# Patient Record
Sex: FEMALE | Race: WHITE | Employment: FULL TIME | URBAN - METROPOLITAN AREA
[De-identification: names, ages, dates, MRNs, and addresses within clinical notes are randomized per-mention and may not be internally consistent; named-entity substitution may affect disease eponyms.]

---

## 2017-03-03 PROBLEM — N92.1 MENOMETRORRHAGIA: Status: ACTIVE | Noted: 2017-03-03

## 2018-12-10 ENCOUNTER — HOSPITAL ENCOUNTER (OUTPATIENT)
Age: 48
Discharge: HOME | End: 2018-12-12
Payer: COMMERCIAL

## 2018-12-10 PROCEDURE — 88305 TISSUE EXAM BY PATHOLOGIST: CPT

## 2019-11-11 ENCOUNTER — HOSPITAL ENCOUNTER (OUTPATIENT)
Dept: GENERAL RADIOLOGY | Age: 49
Discharge: HOME | End: 2019-11-13
Payer: COMMERCIAL

## 2019-11-11 DIAGNOSIS — Z12.31 BREAST CANCER SCREENING BY MAMMOGRAM: ICD-10-CM

## 2019-11-11 PROCEDURE — 77063 BREAST TOMOSYNTHESIS BI: CPT

## 2021-03-11 ENCOUNTER — HOSPITAL ENCOUNTER (OUTPATIENT)
Dept: GENERAL RADIOLOGY | Age: 51
Discharge: HOME | End: 2021-03-13
Payer: COMMERCIAL

## 2021-03-11 DIAGNOSIS — Z12.31 SCREENING MAMMOGRAM, ENCOUNTER FOR: ICD-10-CM

## 2021-03-11 PROCEDURE — 77063 BREAST TOMOSYNTHESIS BI: CPT

## 2021-04-08 ENCOUNTER — HOSPITAL ENCOUNTER (OUTPATIENT)
Dept: GENERAL RADIOLOGY | Age: 51
Discharge: HOME | End: 2021-04-10
Payer: COMMERCIAL

## 2021-04-08 DIAGNOSIS — R92.2 DENSE BREAST TISSUE ON MAMMOGRAM: ICD-10-CM

## 2021-04-08 PROCEDURE — 76641 ULTRASOUND BREAST COMPLETE: CPT

## 2022-08-03 PROBLEM — N92.1 MENOMETRORRHAGIA: Status: RESOLVED | Noted: 2017-03-03 | Resolved: 2022-08-03

## 2022-12-05 ENCOUNTER — HOSPITAL ENCOUNTER (OUTPATIENT)
Dept: GENERAL RADIOLOGY | Age: 52
Discharge: HOME | End: 2022-12-07
Payer: COMMERCIAL

## 2022-12-05 VITALS — HEIGHT: 64 IN | WEIGHT: 135 LBS | BODY MASS INDEX: 23.05 KG/M2

## 2022-12-05 DIAGNOSIS — Z12.31 ENCOUNTER FOR SCREENING MAMMOGRAM FOR MALIGNANT NEOPLASM OF BREAST: ICD-10-CM

## 2022-12-05 PROCEDURE — 77067 SCR MAMMO BI INCL CAD: CPT

## 2023-12-06 ENCOUNTER — HOSPITAL ENCOUNTER (OUTPATIENT)
Dept: GENERAL RADIOLOGY | Age: 53
Discharge: HOME | End: 2023-12-08
Payer: COMMERCIAL

## 2023-12-06 VITALS — WEIGHT: 140 LBS | BODY MASS INDEX: 23.9 KG/M2 | HEIGHT: 64 IN

## 2023-12-06 DIAGNOSIS — Z12.31 SCREENING MAMMOGRAM, ENCOUNTER FOR: ICD-10-CM

## 2023-12-06 PROCEDURE — 77063 BREAST TOMOSYNTHESIS BI: CPT

## 2024-01-05 ENCOUNTER — OFFICE VISIT (OUTPATIENT)
Dept: PRIMARY CARE CLINIC | Age: 54
End: 2024-01-05
Payer: COMMERCIAL

## 2024-01-05 VITALS
BODY MASS INDEX: 26.75 KG/M2 | OXYGEN SATURATION: 98 % | RESPIRATION RATE: 19 BRPM | WEIGHT: 151 LBS | SYSTOLIC BLOOD PRESSURE: 139 MMHG | TEMPERATURE: 97.3 F | DIASTOLIC BLOOD PRESSURE: 84 MMHG | HEART RATE: 71 BPM | HEIGHT: 63 IN

## 2024-01-05 DIAGNOSIS — B96.89 ACUTE BACTERIAL SINUSITIS: Primary | ICD-10-CM

## 2024-01-05 DIAGNOSIS — H69.83 ACUTE DYSFUNCTION OF BOTH EUSTACHIAN TUBES: ICD-10-CM

## 2024-01-05 DIAGNOSIS — J01.90 ACUTE BACTERIAL SINUSITIS: Primary | ICD-10-CM

## 2024-01-05 PROCEDURE — 99213 OFFICE O/P EST LOW 20 MIN: CPT | Performed by: NURSE PRACTITIONER

## 2024-01-05 RX ORDER — CEFDINIR 300 MG/1
300 CAPSULE ORAL 2 TIMES DAILY
Qty: 20 CAPSULE | Refills: 0 | Status: SHIPPED | OUTPATIENT
Start: 2024-01-05 | End: 2024-01-15

## 2024-01-05 RX ORDER — PREDNISONE 10 MG/1
10 TABLET ORAL 2 TIMES DAILY
Qty: 6 TABLET | Refills: 0 | Status: SHIPPED | OUTPATIENT
Start: 2024-01-05 | End: 2024-01-08

## 2024-01-05 RX ORDER — AZELASTINE 1 MG/ML
1 SPRAY, METERED NASAL 2 TIMES DAILY
Qty: 30 ML | Refills: 0 | Status: SHIPPED | OUTPATIENT
Start: 2024-01-05

## 2024-01-05 NOTE — PROGRESS NOTES
Chief Complaint:   Congestion and Otalgia      History of Present Illness   Source of history provided by:  patient.      Annamarie Osman is a 53 y.o. old female with a past medical history of:   Past Medical History:   Diagnosis Date    Headache(784.0)     Migraine        Pt  presents to the [facility] with nasal congestion/sinus pressure and bilateral ear fullness for the past several days.  No fever noted.   Denies any N/V/D, abdominal pain, CP, progressive SOB, dizziness, or lethargy.           ROS    Unless otherwise stated in this report or unable to obtain because of the patient's clinical or mental status as evidenced by the medical record, this patients's positive and negative responses for Review of Systems, constitutional, psych, eyes, ENT, cardiovascular, respiratory, gastrointestinal, neurological, genitourinary, musculoskeletal, integument systems and systems related to the presenting problem are either stated in the preceding or were not pertinent or were negative for the symptoms and/or complaints related to the medical problem.    Past Surgical History:  has a past surgical history that includes  section; eye surgery (); Foot surgery; Tonsillectomy; Kidney stone surgery (2013); Nasal sinus surgery (2014); hysteroscopy (2017); Breast surgery; and Breast reduction surgery.  Social History:  reports that she has never smoked. She has never used smokeless tobacco. She reports current alcohol use. She reports that she does not use drugs.  Family History: family history includes Asthma in her father; Migraines in her mother; Other (age of onset: 57) in her mother; Stomach Cancer in her maternal grandmother.   Allergies: Lactose intolerance (gi), Clindamycin/lincomycin, and Tape [adhesive tape]    Physical Exam         VS:  /84   Pulse 71   Temp 97.3 °F (36.3 °C)   Resp 19   Ht 1.6 m (5' 3\")   Wt 68.5 kg (151 lb)   LMP 02/10/2020 (Exact Date)   SpO2 98%   BMI 26.75

## 2024-01-30 ENCOUNTER — OFFICE VISIT (OUTPATIENT)
Dept: PHYSICAL MEDICINE AND REHAB | Age: 54
End: 2024-01-30
Payer: COMMERCIAL

## 2024-01-30 VITALS
DIASTOLIC BLOOD PRESSURE: 70 MMHG | TEMPERATURE: 97.9 F | BODY MASS INDEX: 25.69 KG/M2 | HEART RATE: 68 BPM | WEIGHT: 145 LBS | HEIGHT: 63 IN | SYSTOLIC BLOOD PRESSURE: 122 MMHG

## 2024-01-30 DIAGNOSIS — G89.29 CHRONIC NECK PAIN: ICD-10-CM

## 2024-01-30 DIAGNOSIS — M25.512 CHRONIC LEFT SHOULDER PAIN: Primary | ICD-10-CM

## 2024-01-30 DIAGNOSIS — M54.2 CHRONIC NECK PAIN: ICD-10-CM

## 2024-01-30 DIAGNOSIS — M75.52 SUBACROMIAL BURSITIS OF LEFT SHOULDER JOINT: ICD-10-CM

## 2024-01-30 DIAGNOSIS — G89.29 CHRONIC LEFT SHOULDER PAIN: Primary | ICD-10-CM

## 2024-01-30 PROCEDURE — 20610 DRAIN/INJ JOINT/BURSA W/O US: CPT | Performed by: PHYSICAL MEDICINE & REHABILITATION

## 2024-01-30 PROCEDURE — 99214 OFFICE O/P EST MOD 30 MIN: CPT | Performed by: PHYSICAL MEDICINE & REHABILITATION

## 2024-01-30 RX ORDER — MINOCYCLINE HYDROCHLORIDE 100 MG/1
TABLET ORAL
COMMUNITY
Start: 2024-01-04

## 2024-01-30 RX ORDER — TRIAMCINOLONE ACETONIDE 40 MG/ML
40 INJECTION, SUSPENSION INTRA-ARTICULAR; INTRAMUSCULAR ONCE
Status: COMPLETED | OUTPATIENT
Start: 2024-01-30 | End: 2024-01-30

## 2024-01-30 RX ORDER — TOPIRAMATE 200 MG
TABLET ORAL
COMMUNITY

## 2024-01-30 RX ORDER — LIDOCAINE HYDROCHLORIDE 10 MG/ML
4 INJECTION, SOLUTION EPIDURAL; INFILTRATION; INTRACAUDAL; PERINEURAL ONCE
Status: COMPLETED | OUTPATIENT
Start: 2024-01-30 | End: 2024-01-30

## 2024-01-30 RX ORDER — BUPROPION HYDROCHLORIDE 150 MG/1
TABLET ORAL
COMMUNITY
Start: 2023-12-12

## 2024-01-30 RX ADMIN — TRIAMCINOLONE ACETONIDE 40 MG: 40 INJECTION, SUSPENSION INTRA-ARTICULAR; INTRAMUSCULAR at 10:09

## 2024-01-30 RX ADMIN — LIDOCAINE HYDROCHLORIDE 4 ML: 10 INJECTION, SOLUTION EPIDURAL; INFILTRATION; INTRACAUDAL; PERINEURAL at 10:08

## 2024-01-30 NOTE — PROGRESS NOTES
Jayna Benavides, DO  [facility] Physical Medicine and Rehabilitation  [address] [address]  Phone: [phone]  Fax: [phone]    PCP: Los Zaldivar MD  Date of visit: 1/30/24    Chief Complaint   Patient presents with    Neck Pain    Shoulder Pain       Interval:   Patient presents today for follow up after starting PT. She notes worsening left shoulder pain since I last saw her. She feels starting PT may have flared her pain.  The pain is rated Pain Score:   6, is described as achy, and is located in the neck and left shoulder. Left shoulder pain is worse with movement. Notes tingling in D4-5 bilaterally.  The pain is better with  nothing .  The pain is worse with  ROM . There is associated numbness/tingling. There is weakness. There is no bowel/bladder changes.     The prior workup has included: Xray C spine, XR shoulder    The prior treatment has included:  PT: currently   Chiropractic: none    Modalities: biofreeze, heating pack   OTC Tylenol: yes  NSAIDS: states she can't take these due to elevated creatinine  Opioids: none    Membrane stabilizers: topamax, pamelor for migraine  Muscle relaxers: none    Previous injections: none    Previous surgery at this site: none    Allergies   Allergen Reactions    Lactose Intolerance (Gi) Diarrhea and Nausea Only    Clindamycin/Lincomycin Diarrhea and Nausea And Vomiting    Tape [Adhesive Tape] Hives and Rash       Current Outpatient Medications   Medication Sig Dispense Refill    minocycline (DYNACIN) 100 MG tablet       buPROPion (WELLBUTRIN XL) 150 MG extended release tablet       TOPAMAX 200 MG tablet Topamax 200 mg tablet      azelastine (ASTELIN) 0.1 % nasal spray 1 spray by Nasal route 2 times daily Use in each nostril as directed 30 mL 0    Ciclopirox 1 % SHAM       clindamycin (CLEOCIN T) 1 % lotion       clobetasol (TEMOVATE) 0.05 % external solution       ketoconazole (NIZORAL) 2 % shampoo       estradiol

## 2024-02-07 ENCOUNTER — TELEPHONE (OUTPATIENT)
Dept: FAMILY MEDICINE CLINIC | Age: 54
End: 2024-02-07

## 2024-02-07 NOTE — TELEPHONE ENCOUNTER
Patient is scheduled on 2/09/24 here in [place] for MRI Cervical Spine and MRI Left Shoulder.  The Cervical Spine has been approved.  The MRI of the shoulder has been denied.  Letter from Billy is in media.  Thank you.

## 2024-02-09 ENCOUNTER — TELEPHONE (OUTPATIENT)
Dept: FAMILY MEDICINE CLINIC | Age: 54
End: 2024-02-09

## 2024-02-09 NOTE — TELEPHONE ENCOUNTER
I started the appeal for the MRI Left Shoulder.  For the appeal you'll need to fax the PT notes and a letter of medical necessity to [facility]. The fax number is [phone].  They have to have the information by 2/15/24.     On the fax cover sheet:   Attention:  Appeals  Order ID: [id number]    Thank you.

## 2024-02-09 NOTE — TELEPHONE ENCOUNTER
So received this denial for shoulder MRI, I have the PT notes but need to type a letter of medical necessity and fax it to [facility] by Feb 15 2024, can you let me know what I should include in letter ?? Please advise  thanks

## 2024-02-12 ENCOUNTER — TELEPHONE (OUTPATIENT)
Dept: PHYSICAL MEDICINE AND REHAB | Age: 54
End: 2024-02-12

## 2024-02-12 NOTE — TELEPHONE ENCOUNTER
----- Message from Jayna Benavides DO sent at 2/12/2024  9:23 AM EST -----  Please call patient with MRI results- arthritis worse at C5-6 causing RIGHT sided stenosis. Nothing to explain left shoulder pain so I want to wait for the shoulder MRI.

## 2024-02-12 NOTE — TELEPHONE ENCOUNTER
Called and informed patient of MRI results, explained I sent PT notes along with a letter to see if MRI shoulder would be approved, will keep patient updated

## 2024-03-07 ENCOUNTER — TELEPHONE (OUTPATIENT)
Dept: PHYSICAL MEDICINE AND REHAB | Age: 54
End: 2024-03-07

## 2024-03-07 DIAGNOSIS — S43.432A TEAR OF LEFT GLENOID LABRUM, INITIAL ENCOUNTER: ICD-10-CM

## 2024-03-07 DIAGNOSIS — G89.29 CHRONIC LEFT SHOULDER PAIN: Primary | ICD-10-CM

## 2024-03-07 DIAGNOSIS — M25.512 CHRONIC LEFT SHOULDER PAIN: Primary | ICD-10-CM

## 2024-03-07 NOTE — TELEPHONE ENCOUNTER
Called and spoke with patient who was instructed on her MRI results. She states she got some relief from injection reporting 60 percent. States she has been careful what she has been doing and if you want to send a surgical referral that would be fine with her to see what her options might be. She states has done PT as well. Please advise.

## 2024-03-07 NOTE — TELEPHONE ENCOUNTER
----- Message from Jayna Benavides DO sent at 3/7/2024  2:03 PM EST -----  Please call patient.   How did the shoulder injection help?   I reviewed shoulder MRI and there is low grade partial thickness tearing of the bursa and labrum.     Treatment for this is PT, injection and if no relief, can send for surgical opinion.

## 2024-03-13 ENCOUNTER — OFFICE VISIT (OUTPATIENT)
Dept: ORTHOPEDIC SURGERY | Age: 54
End: 2024-03-13
Payer: COMMERCIAL

## 2024-03-13 VITALS — TEMPERATURE: 98 F | HEIGHT: 63 IN | WEIGHT: 145 LBS | BODY MASS INDEX: 25.69 KG/M2

## 2024-03-13 DIAGNOSIS — M19.012 ARTHRITIS OF LEFT ACROMIOCLAVICULAR JOINT: ICD-10-CM

## 2024-03-13 DIAGNOSIS — M25.512 CHRONIC LEFT SHOULDER PAIN: Primary | ICD-10-CM

## 2024-03-13 DIAGNOSIS — M75.102 TEAR OF LEFT ROTATOR CUFF, UNSPECIFIED TEAR EXTENT, UNSPECIFIED WHETHER TRAUMATIC: ICD-10-CM

## 2024-03-13 DIAGNOSIS — G89.29 CHRONIC LEFT SHOULDER PAIN: Primary | ICD-10-CM

## 2024-03-13 PROCEDURE — 99204 OFFICE O/P NEW MOD 45 MIN: CPT | Performed by: ORTHOPAEDIC SURGERY

## 2024-03-13 NOTE — PROGRESS NOTES
Review of Systems:   As follows except as previously noted in HPI:  Constitutional: Negative for chills, diaphoresis,  fever   Respiratory: Negative for cough, shortness of breath and wheezing.    Cardiovascular: Negative for chest pain and palpitations.   Neurological: Negative for dizziness, syncope,   GI / : abdominal pain or cramping  Musculoskeletal: see HPI       Objective:   Physical Exam   Constitutional: Oriented to person, place, and time. and appears well-developed and well-nourished. :   Head: Normocephalic and atraumatic.   Neck: Neck supple.  Eyes: EOM are normal.   Pulmonary/Chest: Effort normal.  No respiratory distress, no wheezes.   Neurological: Alert and oriented to person  Skin: Skin is warm and dry.               JACK Romero, DO    3/13/24  12:38 PM    All reasonable efforts have been made to minimize the risk of errors that may occur in the use of voice recognition and other electronic means of charting.

## 2024-03-14 ENCOUNTER — PREP FOR PROCEDURE (OUTPATIENT)
Dept: ORTHOPEDIC SURGERY | Age: 54
End: 2024-03-14

## 2024-03-14 ENCOUNTER — TELEPHONE (OUTPATIENT)
Dept: ORTHOPEDIC SURGERY | Age: 54
End: 2024-03-14

## 2024-03-14 PROBLEM — M75.102 TEAR OF LEFT ROTATOR CUFF: Status: ACTIVE | Noted: 2024-03-14

## 2024-04-05 ENCOUNTER — ANESTHESIA EVENT (OUTPATIENT)
Dept: OPERATING ROOM | Age: 54
End: 2024-04-05
Payer: COMMERCIAL

## 2024-04-11 RX ORDER — TRETINOIN 0.5 MG/G
CREAM TOPICAL
COMMUNITY
Start: 2024-02-02

## 2024-04-17 NOTE — ANESTHESIA PRE PROCEDURE
Department of Anesthesiology  Preprocedure Note       Name:  Annamarie Osman   Age:  53 y.o.  :  1970                                          MRN:  [id number]         Date:  2024      Surgeon: Surgeon(s):  JACK Romero DO    Procedure: Procedure(s):  SHOULDER ARTHROSCOPY ROTATOR CUFF REPAIR    Medications prior to admission:   Prior to Admission medications    Medication Sig Start Date End Date Taking? Authorizing Provider   tretinoin (RETIN-A) 0.05 % cream APPLY SPARINGLY TO AFFECTED AREA(S) ONCE DAILY AT BEDTIME 24  Yes ProviderMarium MD   minocycline (DYNACIN) 100 MG tablet as needed 24   Marium Vallejo MD   buPROPion (WELLBUTRIN XL) 150 MG extended release tablet 1 tablet every morning 23   Marium Vallejo MD   TOPAMAX 200 MG tablet at bedtime Pt to confirm dosage- takes migraine    Marium Vallejo MD   azelastine (ASTELIN) 0.1 % nasal spray 1 spray by Nasal route 2 times daily Use in each nostril as directed  Patient taking differently: 1 spray by Nasal route as needed Use in each nostril as directed 24   Vanessa Gilliam, APRN - CNP   Ciclopirox 1 % SHAM  23   Marium Vallejo MD   clindamycin (CLEOCIN T) 1 % lotion  23   Marium Vallejo MD   clobetasol (TEMOVATE) 0.05 % external solution  23   Marium Vallejo MD   ketoconazole (NIZORAL) 2 % shampoo  23   Marium Vallejo MD   estradiol (ESTRACE) 0.1 MG/GM vaginal cream APPLY 1/2g VAGINALLY THREE TIMES A WEEK AT NIGHT TIME 23   Sylwia Rachel WHNP   nortriptyline (PAMELOR) 10 MG capsule Take 1 capsule by mouth nightly Takes 30 mg    Marium Vallejo MD   Multiple Vitamins-Minerals (THERAPEUTIC MULTIVITAMIN-MINERALS) tablet Take 1 tablet by mouth daily    Marium Vallejo MD   RELPAX 40 MG tablet Take 1 tablet by mouth once as needed 6/1/15   Marium Vallejo MD       Current medications:    No current facility-administered

## 2024-04-18 ENCOUNTER — HOSPITAL ENCOUNTER (OUTPATIENT)
Age: 54
Setting detail: OUTPATIENT SURGERY
Discharge: HOME | End: 2024-04-18
Attending: ORTHOPAEDIC SURGERY | Admitting: ORTHOPAEDIC SURGERY
Payer: COMMERCIAL

## 2024-04-18 ENCOUNTER — ANESTHESIA (OUTPATIENT)
Dept: OPERATING ROOM | Age: 54
End: 2024-04-18
Payer: COMMERCIAL

## 2024-04-18 VITALS
WEIGHT: 147 LBS | HEART RATE: 72 BPM | BODY MASS INDEX: 26.05 KG/M2 | OXYGEN SATURATION: 100 % | HEIGHT: 63 IN | SYSTOLIC BLOOD PRESSURE: 115 MMHG | TEMPERATURE: 97.7 F | DIASTOLIC BLOOD PRESSURE: 67 MMHG | RESPIRATION RATE: 14 BRPM

## 2024-04-18 DIAGNOSIS — M25.812 IMPINGEMENT OF LEFT SHOULDER: Primary | Chronic | ICD-10-CM

## 2024-04-18 DIAGNOSIS — M75.102 TEAR OF LEFT ROTATOR CUFF, UNSPECIFIED TEAR EXTENT, UNSPECIFIED WHETHER TRAUMATIC: ICD-10-CM

## 2024-04-18 DIAGNOSIS — M75.52 BURSITIS OF SHOULDER, LEFT: Chronic | ICD-10-CM

## 2024-04-18 PROCEDURE — L3660 SO 8 AB RSTR CAN/WEB PRE OTS: HCPCS | Performed by: ORTHOPAEDIC SURGERY

## 2024-04-18 PROCEDURE — 3600000004 HC SURGERY LEVEL 4 BASE: Performed by: ORTHOPAEDIC SURGERY

## 2024-04-18 PROCEDURE — 3600000014 HC SURGERY LEVEL 4 ADDTL 15MIN: Performed by: ORTHOPAEDIC SURGERY

## 2024-04-18 PROCEDURE — 2500000003 HC RX 250 WO HCPCS: Performed by: ORTHOPAEDIC SURGERY

## 2024-04-18 PROCEDURE — 2580000003 HC RX 258: Performed by: ORTHOPAEDIC SURGERY

## 2024-04-18 PROCEDURE — 7100000010 HC PHASE II RECOVERY - FIRST 15 MIN: Performed by: ORTHOPAEDIC SURGERY

## 2024-04-18 PROCEDURE — 2709999900 HC NON-CHARGEABLE SUPPLY: Performed by: ORTHOPAEDIC SURGERY

## 2024-04-18 PROCEDURE — C1713 ANCHOR/SCREW BN/BN,TIS/BN: HCPCS | Performed by: ORTHOPAEDIC SURGERY

## 2024-04-18 PROCEDURE — 3700000000 HC ANESTHESIA ATTENDED CARE: Performed by: ORTHOPAEDIC SURGERY

## 2024-04-18 PROCEDURE — 3700000001 HC ADD 15 MINUTES (ANESTHESIA): Performed by: ORTHOPAEDIC SURGERY

## 2024-04-18 PROCEDURE — 7100000001 HC PACU RECOVERY - ADDTL 15 MIN: Performed by: ORTHOPAEDIC SURGERY

## 2024-04-18 PROCEDURE — 2500000003 HC RX 250 WO HCPCS

## 2024-04-18 PROCEDURE — 29826 SHO ARTHRS SRG DECOMPRESSION: CPT | Performed by: ORTHOPAEDIC SURGERY

## 2024-04-18 PROCEDURE — 7100000000 HC PACU RECOVERY - FIRST 15 MIN: Performed by: ORTHOPAEDIC SURGERY

## 2024-04-18 PROCEDURE — 7100000011 HC PHASE II RECOVERY - ADDTL 15 MIN: Performed by: ORTHOPAEDIC SURGERY

## 2024-04-18 PROCEDURE — 29827 SHO ARTHRS SRG RT8TR CUF RPR: CPT | Performed by: ORTHOPAEDIC SURGERY

## 2024-04-18 PROCEDURE — 6360000002 HC RX W HCPCS: Performed by: ORTHOPAEDIC SURGERY

## 2024-04-18 PROCEDURE — 2720000010 HC SURG SUPPLY STERILE: Performed by: ORTHOPAEDIC SURGERY

## 2024-04-18 PROCEDURE — 2580000003 HC RX 258: Performed by: ANESTHESIOLOGY

## 2024-04-18 PROCEDURE — 64415 NJX AA&/STRD BRCH PLXS IMG: CPT | Performed by: ANESTHESIOLOGY

## 2024-04-18 PROCEDURE — 6360000002 HC RX W HCPCS

## 2024-04-18 DEVICE — VERSALOK ANCHOR W/ORTHOCORD PEEK & TITANIUM ANCHOR (1) VIOLET (1) BLUE STRAND, SIZE 2 (5 METRIC) ORTHOCORD BRAIDED COMPOSITE SUTURE, 36 INCHES (91CM)
Type: IMPLANTABLE DEVICE | Site: SHOULDER | Status: FUNCTIONAL
Brand: VERSALOK ORTHOCORD

## 2024-04-18 RX ORDER — SODIUM CHLORIDE 9 MG/ML
INJECTION, SOLUTION INTRAVENOUS PRN
Status: DISCONTINUED | OUTPATIENT
Start: 2024-04-18 | End: 2024-04-18 | Stop reason: HOSPADM

## 2024-04-18 RX ORDER — MIDAZOLAM HYDROCHLORIDE 1 MG/ML
INJECTION INTRAMUSCULAR; INTRAVENOUS PRN
Status: DISCONTINUED | OUTPATIENT
Start: 2024-04-18 | End: 2024-04-18 | Stop reason: SDUPTHER

## 2024-04-18 RX ORDER — NEOSTIGMINE METHYLSULFATE 1 MG/ML
INJECTION, SOLUTION INTRAVENOUS PRN
Status: DISCONTINUED | OUTPATIENT
Start: 2024-04-18 | End: 2024-04-18 | Stop reason: SDUPTHER

## 2024-04-18 RX ORDER — KETAMINE HCL IN NACL, ISO-OSM 100MG/10ML
SYRINGE (ML) INJECTION PRN
Status: DISCONTINUED | OUTPATIENT
Start: 2024-04-18 | End: 2024-04-18 | Stop reason: SDUPTHER

## 2024-04-18 RX ORDER — PROPOFOL 10 MG/ML
INJECTION, EMULSION INTRAVENOUS PRN
Status: DISCONTINUED | OUTPATIENT
Start: 2024-04-18 | End: 2024-04-18 | Stop reason: SDUPTHER

## 2024-04-18 RX ORDER — HYDROMORPHONE HYDROCHLORIDE 1 MG/ML
0.25 INJECTION, SOLUTION INTRAMUSCULAR; INTRAVENOUS; SUBCUTANEOUS EVERY 5 MIN PRN
Status: DISCONTINUED | OUTPATIENT
Start: 2024-04-18 | End: 2024-04-18 | Stop reason: HOSPADM

## 2024-04-18 RX ORDER — ONDANSETRON 2 MG/ML
4 INJECTION INTRAMUSCULAR; INTRAVENOUS
Status: DISCONTINUED | OUTPATIENT
Start: 2024-04-18 | End: 2024-04-18 | Stop reason: HOSPADM

## 2024-04-18 RX ORDER — SODIUM CHLORIDE, SODIUM LACTATE, POTASSIUM CHLORIDE, CALCIUM CHLORIDE 600; 310; 30; 20 MG/100ML; MG/100ML; MG/100ML; MG/100ML
INJECTION, SOLUTION INTRAVENOUS CONTINUOUS
Status: DISCONTINUED | OUTPATIENT
Start: 2024-04-18 | End: 2024-04-18 | Stop reason: HOSPADM

## 2024-04-18 RX ORDER — LIDOCAINE HYDROCHLORIDE 20 MG/ML
INJECTION, SOLUTION INTRAVENOUS PRN
Status: DISCONTINUED | OUTPATIENT
Start: 2024-04-18 | End: 2024-04-18 | Stop reason: SDUPTHER

## 2024-04-18 RX ORDER — HYDROCODONE BITARTRATE AND ACETAMINOPHEN 5; 325 MG/1; MG/1
1 TABLET ORAL EVERY 6 HOURS PRN
Qty: 28 TABLET | Refills: 0 | Status: SHIPPED | OUTPATIENT
Start: 2024-04-18 | End: 2024-04-25

## 2024-04-18 RX ORDER — ONDANSETRON 2 MG/ML
INJECTION INTRAMUSCULAR; INTRAVENOUS PRN
Status: DISCONTINUED | OUTPATIENT
Start: 2024-04-18 | End: 2024-04-18 | Stop reason: SDUPTHER

## 2024-04-18 RX ORDER — OXYCODONE HYDROCHLORIDE 5 MG/1
5 TABLET ORAL
Status: DISCONTINUED | OUTPATIENT
Start: 2024-04-18 | End: 2024-04-18 | Stop reason: HOSPADM

## 2024-04-18 RX ORDER — FENTANYL CITRATE 50 UG/ML
INJECTION, SOLUTION INTRAMUSCULAR; INTRAVENOUS PRN
Status: DISCONTINUED | OUTPATIENT
Start: 2024-04-18 | End: 2024-04-18 | Stop reason: SDUPTHER

## 2024-04-18 RX ORDER — GLYCOPYRROLATE 0.2 MG/ML
INJECTION INTRAMUSCULAR; INTRAVENOUS PRN
Status: DISCONTINUED | OUTPATIENT
Start: 2024-04-18 | End: 2024-04-18 | Stop reason: SDUPTHER

## 2024-04-18 RX ORDER — BUPIVACAINE HYDROCHLORIDE AND EPINEPHRINE 2.5; 5 MG/ML; UG/ML
INJECTION, SOLUTION EPIDURAL; INFILTRATION; INTRACAUDAL; PERINEURAL PRN
Status: DISCONTINUED | OUTPATIENT
Start: 2024-04-18 | End: 2024-04-18 | Stop reason: ALTCHOICE

## 2024-04-18 RX ORDER — NALOXONE HYDROCHLORIDE 0.4 MG/ML
INJECTION, SOLUTION INTRAMUSCULAR; INTRAVENOUS; SUBCUTANEOUS PRN
Status: DISCONTINUED | OUTPATIENT
Start: 2024-04-18 | End: 2024-04-18 | Stop reason: HOSPADM

## 2024-04-18 RX ORDER — SODIUM CHLORIDE 0.9 % (FLUSH) 0.9 %
5-40 SYRINGE (ML) INJECTION PRN
Status: DISCONTINUED | OUTPATIENT
Start: 2024-04-18 | End: 2024-04-18 | Stop reason: HOSPADM

## 2024-04-18 RX ORDER — DIPHENHYDRAMINE HYDROCHLORIDE 50 MG/ML
INJECTION INTRAMUSCULAR; INTRAVENOUS PRN
Status: DISCONTINUED | OUTPATIENT
Start: 2024-04-18 | End: 2024-04-18 | Stop reason: SDUPTHER

## 2024-04-18 RX ORDER — DEXAMETHASONE SODIUM PHOSPHATE 10 MG/ML
INJECTION, SOLUTION INTRAMUSCULAR; INTRAVENOUS PRN
Status: DISCONTINUED | OUTPATIENT
Start: 2024-04-18 | End: 2024-04-18 | Stop reason: SDUPTHER

## 2024-04-18 RX ORDER — HYDROMORPHONE HYDROCHLORIDE 1 MG/ML
0.5 INJECTION, SOLUTION INTRAMUSCULAR; INTRAVENOUS; SUBCUTANEOUS EVERY 5 MIN PRN
Status: DISCONTINUED | OUTPATIENT
Start: 2024-04-18 | End: 2024-04-18 | Stop reason: HOSPADM

## 2024-04-18 RX ORDER — SODIUM CHLORIDE 0.9 % (FLUSH) 0.9 %
5-40 SYRINGE (ML) INJECTION EVERY 12 HOURS SCHEDULED
Status: DISCONTINUED | OUTPATIENT
Start: 2024-04-18 | End: 2024-04-18 | Stop reason: HOSPADM

## 2024-04-18 RX ORDER — ROCURONIUM BROMIDE 10 MG/ML
INJECTION, SOLUTION INTRAVENOUS PRN
Status: DISCONTINUED | OUTPATIENT
Start: 2024-04-18 | End: 2024-04-18 | Stop reason: SDUPTHER

## 2024-04-18 RX ADMIN — PROPOFOL 150 MG: 10 INJECTION, EMULSION INTRAVENOUS at 07:39

## 2024-04-18 RX ADMIN — ONDANSETRON 4 MG: 2 INJECTION INTRAMUSCULAR; INTRAVENOUS at 08:30

## 2024-04-18 RX ADMIN — DEXAMETHASONE SODIUM PHOSPHATE 10 MG: 10 INJECTION, SOLUTION INTRAMUSCULAR; INTRAVENOUS at 07:54

## 2024-04-18 RX ADMIN — FENTANYL CITRATE 25 MCG: 50 INJECTION, SOLUTION INTRAMUSCULAR; INTRAVENOUS at 07:22

## 2024-04-18 RX ADMIN — WATER 1000 MG: 1 INJECTION INTRAMUSCULAR; INTRAVENOUS; SUBCUTANEOUS at 07:25

## 2024-04-18 RX ADMIN — FENTANYL CITRATE 25 MCG: 50 INJECTION, SOLUTION INTRAMUSCULAR; INTRAVENOUS at 07:25

## 2024-04-18 RX ADMIN — ROCURONIUM BROMIDE 30 MG: 10 SOLUTION INTRAVENOUS at 07:39

## 2024-04-18 RX ADMIN — GLYCOPYRROLATE 0.6 MG: 0.2 INJECTION, SOLUTION INTRAMUSCULAR; INTRAVENOUS at 08:35

## 2024-04-18 RX ADMIN — SODIUM CHLORIDE, POTASSIUM CHLORIDE, SODIUM LACTATE AND CALCIUM CHLORIDE: 600; 310; 30; 20 INJECTION, SOLUTION INTRAVENOUS at 06:56

## 2024-04-18 RX ADMIN — Medication 3 MG: at 08:35

## 2024-04-18 RX ADMIN — LIDOCAINE HYDROCHLORIDE 40 MG: 20 INJECTION, SOLUTION INTRAVENOUS at 07:39

## 2024-04-18 RX ADMIN — MIDAZOLAM 2 MG: 1 INJECTION INTRAMUSCULAR; INTRAVENOUS at 07:20

## 2024-04-18 RX ADMIN — DIPHENHYDRAMINE HYDROCHLORIDE 12.5 MG: 50 INJECTION, SOLUTION INTRAMUSCULAR; INTRAVENOUS at 08:30

## 2024-04-18 RX ADMIN — Medication 30 MG: at 07:53

## 2024-04-18 ASSESSMENT — PAIN - FUNCTIONAL ASSESSMENT
PAIN_FUNCTIONAL_ASSESSMENT: NONE - DENIES PAIN
PAIN_FUNCTIONAL_ASSESSMENT: 0-10
PAIN_FUNCTIONAL_ASSESSMENT: NONE - DENIES PAIN
PAIN_FUNCTIONAL_ASSESSMENT: NONE - DENIES PAIN

## 2024-04-18 ASSESSMENT — PAIN DESCRIPTION - DESCRIPTORS: DESCRIPTORS: ACHING

## 2024-04-18 NOTE — H&P
History and Physical      CHIEF COMPLAINT: Left shoulder pain    HISTORY OF PRESENT ILLNESS:      Chronic in nature.  No history of injury.    Past Medical History:        Diagnosis Date    Anxiety     Depression     Headache(784.0)     Migraine      Past Surgical History:        Procedure Laterality Date    ABDOMEN SURGERY      tummy tuck    BREAST REDUCTION SURGERY      BREAST SURGERY       SECTION      EYE SURGERY      lasik    FOOT SURGERY      HYSTEROSCOPY  2017    D&C/ left ovarian cysectomy    KIDNEY STONE SURGERY  2013    NASAL SINUS SURGERY  2014    TONSILLECTOMY       Social History:    TOBACCO:   reports that she has never smoked. She has never used smokeless tobacco.  ETOH:   reports that she does not currently use alcohol.  DRUGS:   reports no history of drug use.  Family History:       Problem Relation Age of Onset    Other Mother 57        brain tumor    Migraines Mother     Asthma Father     Stomach Cancer Maternal Grandmother      Medications Prior to Admission:  Medications Prior to Admission: tretinoin (RETIN-A) 0.05 % cream, APPLY SPARINGLY TO AFFECTED AREA(S) ONCE DAILY AT BEDTIME  minocycline (DYNACIN) 100 MG tablet, as needed  buPROPion (WELLBUTRIN XL) 150 MG extended release tablet, 1 tablet every morning  TOPAMAX 200 MG tablet, at bedtime Pt to confirm dosage- takes migraine  azelastine (ASTELIN) 0.1 % nasal spray, 1 spray by Nasal route 2 times daily Use in each nostril as directed (Patient taking differently: 1 spray by Nasal route as needed Use in each nostril as directed)  Ciclopirox 1 % SHAM,   clindamycin (CLEOCIN T) 1 % lotion,   clobetasol (TEMOVATE) 0.05 % external solution,   ketoconazole (NIZORAL) 2 % shampoo,   estradiol (ESTRACE) 0.1 MG/GM vaginal cream, APPLY 1/2g VAGINALLY THREE TIMES A WEEK AT NIGHT TIME  nortriptyline (PAMELOR) 10 MG capsule, Take 1 capsule by mouth nightly Takes 30 mg  Multiple Vitamins-Minerals (THERAPEUTIC MULTIVITAMIN-MINERALS)

## 2024-04-18 NOTE — OP NOTE
Operative report        DATE OF PROCEDURE: 4/18/2024     SURGEON: Nick    ASSISTANT: None    PREOPERATIVE DIAGNOSIS: Rotator cuff tear left shoulder    POSTOPERATIVE DIAGNOSIS: Same with subacromial bursitis and impingement    OPERATION: Arthroscopic exam and treatment left shoulder with debridement and repair rotator cuff tear, subacromial bursectomy and anterior acromioplasty    ANESTHESIA: Interscalene block                    General    ESTIMATED BLOOD LOSS: Scant    COMPLICATIONS: None    SPECIMENS: Was sent to pathology    HISTORY: The patient is a 53 y.o. year old female with history of above preop diagnosis.  I explained the risk, benefits, expected outcome, and alternatives to the procedure.  Patient understands and is in agreement.    PROCEDURE: The patient is brought the operating room after site side were notified.  Interscalene block was done by anesthesia.  A general anesthetic was then administered by anesthesia with the patient supine on operating table.  She was then turned to the right lateral decubitus position on the [facility] backpack with careful axillary padding.  The left upper extremity was prepped and draped in sterile fashion with the arm 45 degrees abduction 20 degrees forward flexion.  The bony anatomy was outlined with skin marker and posterior lateral portal areas were injected with local anesthetic with epinephrine.    The posterior portal was made and the glenohumeral joint was examined.  The subcu scapularis muscle and tendon looked good.  The long head biceps tendon was intact with only minor surface abrasion.  The biceps anchor was intact.  There was some redundancy of the intermargin of the labrum.  The articular surfaces look good.  There were no free bodies.  There was irregularity in instability of the articular side of the anterior supraspinatus tendon.  There is also striated look indicating weakness in these fibers.  A spinal needle was placed through the lateral

## 2024-04-18 NOTE — ANESTHESIA POSTPROCEDURE EVALUATION
Department of Anesthesiology  Postprocedure Note    Patient: Annamarie Osman  MRN: [id number]  YOB: 1970  Date of evaluation: 4/18/2024    Procedure Summary       Date: 04/18/24 Room / Location: [address]    Anesthesia Start: 0719 Anesthesia Stop: 0855    Procedure: SHOULDER ARTHROSCOPY ROTATOR CUFF REPAIR (Left: Shoulder) Diagnosis:       Tear of left rotator cuff      (Tear of left rotator cuff [M75.102])    Surgeons: JACK Romero DO Responsible Provider: Andrea Nettles DO    Anesthesia Type: General, Regional ASA Status: 3            Anesthesia Type: General, Regional    Jose Phase I: Jose Score: 10    Jose Phase II: Jose Score: 10    Anesthesia Post Evaluation    Patient location during evaluation: PACU  Patient participation: complete - patient participated  Level of consciousness: awake and alert  Pain score: 1  Airway patency: patent  Nausea & Vomiting: no nausea and no vomiting  Cardiovascular status: hemodynamically stable  Respiratory status: acceptable  Hydration status: euvolemic  Pain management: adequate    No notable events documented.

## 2024-04-18 NOTE — ANESTHESIA PROCEDURE NOTES
Peripheral Block    Patient location during procedure: procedure area  Reason for block: post-op pain management and at surgeon's request  Start time: 4/18/2024 7:30 AM  End time: 4/18/2024 7:35 AM  Staffing  Performed: anesthesiologist   Anesthesiologist: Andrea Nettles DO  Performed by: Andrea Nettles DO  Authorized by: Andrea Nettles DO    Preanesthetic Checklist  Completed: patient identified, IV checked, site marked, risks and benefits discussed, surgical/procedural consents, equipment checked, pre-op evaluation, timeout performed, anesthesia consent given, oxygen available, monitors applied/VS acknowledged, fire risk safety assessment completed and verbalized and blood product R/B/A discussed and consented  Peripheral Block   Patient position: supine  Prep: ChloraPrep  Provider prep: mask and sterile gloves  Patient monitoring: cardiac monitor, continuous pulse ox, frequent blood pressure checks, IV access, oxygen and responsive to questions  Block type: Brachial plexus  Interscalene  Laterality: left  Injection technique: single-shot  Guidance: nerve stimulator and ultrasound guided  Local infiltration: ropivacaine  Infiltration strength: 0.5 %  Local infiltration: ropivacaine  Dose: 25 mL    Needle   Needle type: insulated echogenic nerve stimulator needle (Stimuplex)   Needle gauge: 20 G  Needle localization: nerve stimulator and ultrasound guidance  Needle length: 10 cm  Assessment   Injection assessment: negative aspiration for heme, no paresthesia on injection and local visualized surrounding nerve on ultrasound  Slow fractionated injection: yes  Hemodynamics: stable  Outcomes: uncomplicated and patient tolerated procedure well

## 2024-04-18 NOTE — DISCHARGE INSTRUCTIONS
Rotator Cuff Repair Post-op Instructions  KELLY Romero D.O.  [phone]    Ice to operative shoulder for the next 24-48 hours.(only during waking hours)    Remove large dressing in 2 days and dress wound lightly there after.    Shower in 3-4 days if wound looks good (without redness or swelling). Replace light dressings (bandaids) to wound after shower.    Pendulum exercises 3-4 times per day. Begin post op day #1.      Do not move affected shoulder when sling is off. Wear sling at all times except when dressing and bathing. May move fingers, wrist and bend at the elbow ONLY.    Resume regular diet and medications (unless otherwise instructed by your doctor)    You should have a responsible adult with you for 24 hrs.    No driving or return to work until approved by Dr. Romero.    Take medications as prescribed.        **Patients find that sleeping in a recliner for a few days after surgery is most comfortable. If sleeping in a bed, try using pillows to elevate your head, back and shoulders. Patients also find that wearing an undershirt under the sling/swathe is more comfortable than having the device on bare skin.           If any problems occur or if you have any further questions, please call your doctor as soon as possible. If you find that you cannot reach your doctor but feel that your condition needs a doctor’s attention go to an emergency room.

## 2024-04-24 ENCOUNTER — TELEPHONE (OUTPATIENT)
Dept: ORTHOPEDIC SURGERY | Age: 54
End: 2024-04-24

## 2024-04-24 NOTE — TELEPHONE ENCOUNTER
Patient called  Has pain in back of arm and elbow.  Cannot sleep at night.  Told her to try ice or heat, which ever works best.  Will also try using Tylenol in between pain pills.

## 2024-04-29 ENCOUNTER — OFFICE VISIT (OUTPATIENT)
Dept: ORTHOPEDIC SURGERY | Age: 54
End: 2024-04-29

## 2024-04-29 VITALS — BODY MASS INDEX: 26.05 KG/M2 | WEIGHT: 147 LBS | HEIGHT: 63 IN

## 2024-04-29 DIAGNOSIS — M75.102 TEAR OF LEFT ROTATOR CUFF, UNSPECIFIED TEAR EXTENT, UNSPECIFIED WHETHER TRAUMATIC: ICD-10-CM

## 2024-04-29 DIAGNOSIS — M25.812 IMPINGEMENT OF LEFT SHOULDER: Primary | ICD-10-CM

## 2024-04-29 DIAGNOSIS — M75.52 BURSITIS OF LEFT SHOULDER: ICD-10-CM

## 2024-04-29 PROCEDURE — 99024 POSTOP FOLLOW-UP VISIT: CPT | Performed by: ORTHOPAEDIC SURGERY

## 2024-04-29 NOTE — PROGRESS NOTES
Chief Complaint:   Chief Complaint   Patient presents with    Post-Op Check     Left Shoulder Arthroscopy DOS 04/18/24 - pt states constant aching pain without relief        Annamarie Osman follows about 11 days postop left shoulder arthroscopic rotator cuff repair and debridement.  She is sore as expected.  She has been doing her pendulum and elbow exercises.  She plans to get a different sling to help protect her neck.      Allergies; medications; past medical, surgical, family, and social history; and problem list have been reviewed today and updated as indicated in this encounter seen below.    Exam: The wounds are healing well without complications.  We discussed the procedure and pathology.    Radiographs: None    ASSESSMENT:    Annamarie was seen today for post-op check.    Diagnoses and all orders for this visit:    Impingement of left shoulder    Tear of left rotator cuff, unspecified tear extent, unspecified whether traumatic    Bursitis of left shoulder        PLAN: Continue the sling and swath with the sling of her choice.  She should continue with her pendulum and elbow exercises.    Return in about 3 weeks (around 5/20/2024).       Current Outpatient Medications   Medication Sig Dispense Refill    tretinoin (RETIN-A) 0.05 % cream APPLY SPARINGLY TO AFFECTED AREA(S) ONCE DAILY AT BEDTIME      minocycline (DYNACIN) 100 MG tablet as needed      buPROPion (WELLBUTRIN XL) 150 MG extended release tablet 1 tablet every morning      TOPAMAX 200 MG tablet at bedtime Pt to confirm dosage- takes migraine      azelastine (ASTELIN) 0.1 % nasal spray 1 spray by Nasal route 2 times daily Use in each nostril as directed (Patient taking differently: 1 spray by Nasal route as needed Use in each nostril as directed) 30 mL 0    Ciclopirox 1 % SHAM       clindamycin (CLEOCIN T) 1 % lotion       clobetasol (TEMOVATE) 0.05 % external solution       ketoconazole (NIZORAL) 2 % shampoo       estradiol (ESTRACE) 0.1 MG/GM vaginal

## 2024-05-20 ENCOUNTER — OFFICE VISIT (OUTPATIENT)
Dept: ORTHOPEDIC SURGERY | Age: 54
End: 2024-05-20

## 2024-05-20 VITALS — BODY MASS INDEX: 26.05 KG/M2 | HEIGHT: 63 IN | WEIGHT: 147 LBS | TEMPERATURE: 98 F

## 2024-05-20 DIAGNOSIS — M75.52 BURSITIS OF LEFT SHOULDER: ICD-10-CM

## 2024-05-20 DIAGNOSIS — M25.812 IMPINGEMENT OF LEFT SHOULDER: Primary | ICD-10-CM

## 2024-05-20 DIAGNOSIS — M75.102 TEAR OF LEFT ROTATOR CUFF, UNSPECIFIED TEAR EXTENT, UNSPECIFIED WHETHER TRAUMATIC: ICD-10-CM

## 2024-05-20 DIAGNOSIS — M19.012 ARTHRITIS OF LEFT ACROMIOCLAVICULAR JOINT: ICD-10-CM

## 2024-05-20 DIAGNOSIS — M75.102 TEAR OF LEFT ROTATOR CUFF, UNSPECIFIED TEAR EXTENT, UNSPECIFIED WHETHER TRAUMATIC: Primary | ICD-10-CM

## 2024-05-20 PROCEDURE — 99024 POSTOP FOLLOW-UP VISIT: CPT | Performed by: ORTHOPAEDIC SURGERY

## 2024-05-20 NOTE — PROGRESS NOTES
Chief Complaint:   Chief Complaint   Patient presents with    Shoulder Pain     Left shoulder arthroscopy f/u DOS 4/18/24. Constantly feels like a tooth ache and is having neck pain from the sling.       Annamarie Osman follows up 32 days postop left shoulder arthroscopic rotator cuff repair and debridement.  She still is hurting pretty much.  She is very cautious about her exercises.  She got a pad per her sling strap.      Allergies; medications; past medical, surgical, family, and social history; and problem list have been reviewed today and updated as indicated in this encounter seen below.    Exam: The wounds are healing well.  There are no complications suggested.  She is a little tender around the shoulders as expected.  Range of motion is quite guarded but it was possible to get her to relax a little and she is pliable with range of motion though limited.    Radiographs: None    ASSESSMENT:    Annamarie was seen today for shoulder pain.    Diagnoses and all orders for this visit:    Impingement of left shoulder    Bursitis of left shoulder    Arthritis of left acromioclavicular joint    Tear of left rotator cuff, unspecified tear extent, unspecified whether traumatic        PLAN: We will schedule outpatient physical therapy where she will also get instruction for home exercise which will help her progress if diligent.    Return in about 4 weeks (around 6/17/2024).       Current Outpatient Medications   Medication Sig Dispense Refill    tretinoin (RETIN-A) 0.05 % cream APPLY SPARINGLY TO AFFECTED AREA(S) ONCE DAILY AT BEDTIME      minocycline (DYNACIN) 100 MG tablet as needed      buPROPion (WELLBUTRIN XL) 150 MG extended release tablet 1 tablet every morning      TOPAMAX 200 MG tablet at bedtime Pt to confirm dosage- takes migraine      azelastine (ASTELIN) 0.1 % nasal spray 1 spray by Nasal route 2 times daily Use in each nostril as directed (Patient taking differently: 1 spray by Nasal route as needed Use in

## 2024-06-03 DIAGNOSIS — M25.812 IMPINGEMENT OF LEFT SHOULDER: Primary | ICD-10-CM

## 2024-06-03 RX ORDER — TRAMADOL HYDROCHLORIDE 50 MG/1
50 TABLET ORAL EVERY 6 HOURS PRN
Qty: 28 TABLET | Refills: 0 | Status: SHIPPED | OUTPATIENT
Start: 2024-06-03 | End: 2024-06-10

## 2024-06-17 ENCOUNTER — OFFICE VISIT (OUTPATIENT)
Dept: ORTHOPEDIC SURGERY | Age: 54
End: 2024-06-17

## 2024-06-17 VITALS — WEIGHT: 147 LBS | HEIGHT: 63 IN | TEMPERATURE: 98 F | BODY MASS INDEX: 26.05 KG/M2

## 2024-06-17 DIAGNOSIS — M75.102 TEAR OF LEFT ROTATOR CUFF, UNSPECIFIED TEAR EXTENT, UNSPECIFIED WHETHER TRAUMATIC: ICD-10-CM

## 2024-06-17 DIAGNOSIS — M75.52 BURSITIS OF LEFT SHOULDER: ICD-10-CM

## 2024-06-17 DIAGNOSIS — M19.012 ARTHRITIS OF LEFT ACROMIOCLAVICULAR JOINT: ICD-10-CM

## 2024-06-17 DIAGNOSIS — M25.812 IMPINGEMENT OF LEFT SHOULDER: Primary | ICD-10-CM

## 2024-06-17 PROCEDURE — 99024 POSTOP FOLLOW-UP VISIT: CPT | Performed by: ORTHOPAEDIC SURGERY

## 2024-06-17 NOTE — PROGRESS NOTES
Chief Complaint:   Chief Complaint   Patient presents with    Shoulder Pain     Left shoulder arthroscopy DOS 4/18/24. Still having tooth ache and showing no improvement. In Pt about three times a week. Does show improvement in ROM.        Annamarie Osman follows up 60 days postop left shoulder arthroscopic rotator cuff repair and debridement.  She complains of constant aching in the lateral arm and forearm.  She takes the tramadol on an occasional basis.      Annamarie takes Topamax on a maintenance basis for migraines and uses Relpax as acute relief.  She uses Biofreeze on her arm.  She uses a pillow for support when she is trying to sleep.  She is not able to use lidocaine patches because of skin breakout.  She cannot use oral anti-inflammatories because of previous problems with elevated creatinine.      Allergies; medications; past medical, surgical, family, and social history; and problem list have been reviewed today and updated as indicated in this encounter seen below.    Exam: Active strength is fair at this point.  Range of motion is limited to about 100 degrees abduction.  Flexion is to about 120.  External rotation is still significantly limited.  Internal rotation is to the left SI joint.    Radiographs: None    ASSESSMENT:    Annamarie was seen today for shoulder pain.    Diagnoses and all orders for this visit:    Impingement of left shoulder    Bursitis of left shoulder    Arthritis of left acromioclavicular joint    Tear of left rotator cuff, unspecified tear extent, unspecified whether traumatic        PLAN: Continue with physical therapy and home exercise as instructed.    Return in about 4 weeks (around 7/15/2024).       Current Outpatient Medications   Medication Sig Dispense Refill    tretinoin (RETIN-A) 0.05 % cream APPLY SPARINGLY TO AFFECTED AREA(S) ONCE DAILY AT BEDTIME      minocycline (DYNACIN) 100 MG tablet as needed      buPROPion (WELLBUTRIN XL) 150 MG extended release tablet 1 tablet every

## 2024-06-28 ENCOUNTER — HOSPITAL ENCOUNTER (OUTPATIENT)
Age: 54
Discharge: HOME | End: 2024-06-30

## 2024-07-10 ENCOUNTER — OFFICE VISIT (OUTPATIENT)
Dept: ORTHOPEDIC SURGERY | Age: 54
End: 2024-07-10

## 2024-07-10 VITALS — TEMPERATURE: 98 F | BODY MASS INDEX: 26.05 KG/M2 | HEIGHT: 63 IN | WEIGHT: 147 LBS

## 2024-07-10 DIAGNOSIS — M25.812 IMPINGEMENT OF LEFT SHOULDER: Primary | ICD-10-CM

## 2024-07-10 DIAGNOSIS — M19.012 ARTHRITIS OF LEFT ACROMIOCLAVICULAR JOINT: ICD-10-CM

## 2024-07-10 DIAGNOSIS — M75.102 TEAR OF LEFT ROTATOR CUFF, UNSPECIFIED TEAR EXTENT, UNSPECIFIED WHETHER TRAUMATIC: ICD-10-CM

## 2024-07-10 LAB — SURGICAL PATHOLOGY REPORT: NORMAL

## 2024-07-10 PROCEDURE — 99024 POSTOP FOLLOW-UP VISIT: CPT | Performed by: ORTHOPAEDIC SURGERY

## 2024-07-10 NOTE — PROGRESS NOTES
children: None    Years of education: None    Highest education level: None   Tobacco Use    Smoking status: Never    Smokeless tobacco: Never   Vaping Use    Vaping Use: Never used   Substance and Sexual Activity    Alcohol use: Not Currently     Comment: Rarely    Drug use: No    Sexual activity: Yes       Review of Systems  As follows except as previously noted in HPI:  Constitutional: Negative for chills, diaphoresis, fatigue, fever and unexpected weight change.   Respiratory: Negative for cough, shortness of breath and wheezing.    Cardiovascular: Negative for chest pain and palpitations.   Neurological: Negative for dizziness, syncope, cephalgia.  GI / : negative  Musculoskeletal: see HPI       Objective:   Physical Exam   Constitutional: Oriented to person, place, and time. and appears well-developed and well-nourished. :   Head: Normocephalic and atraumatic.   Eyes: EOM are normal.   Neck: Neck supple.   Cardiovascular: Normal rate and regular rhythm.    Pulmonary/Chest: Effort normal. No stridor. No respiratory distress, no wheezes.   Abdominal:  No abnormal distension.    Neurological: Alert and oriented to person, place, and time.   Skin: Skin is warm and dry.   Psychiatric: Normal mood and affect. Behavior is normal. Thought content normal.    JACK Romero,     7/10/24  9:59 AM

## 2024-08-07 ENCOUNTER — OFFICE VISIT (OUTPATIENT)
Dept: ORTHOPEDIC SURGERY | Age: 54
End: 2024-08-07
Payer: COMMERCIAL

## 2024-08-07 VITALS — HEIGHT: 63 IN | WEIGHT: 148 LBS | BODY MASS INDEX: 26.22 KG/M2

## 2024-08-07 DIAGNOSIS — M25.812 IMPINGEMENT OF LEFT SHOULDER: Primary | ICD-10-CM

## 2024-08-07 DIAGNOSIS — M19.012 ARTHRITIS OF LEFT ACROMIOCLAVICULAR JOINT: ICD-10-CM

## 2024-08-07 DIAGNOSIS — M75.102 TEAR OF LEFT ROTATOR CUFF, UNSPECIFIED TEAR EXTENT, UNSPECIFIED WHETHER TRAUMATIC: ICD-10-CM

## 2024-08-07 DIAGNOSIS — M75.52 BURSITIS OF LEFT SHOULDER: ICD-10-CM

## 2024-08-07 PROCEDURE — 99213 OFFICE O/P EST LOW 20 MIN: CPT | Performed by: ORTHOPAEDIC SURGERY

## 2024-08-07 NOTE — PROGRESS NOTES
Chief Complaint:   Chief Complaint   Patient presents with    Shoulder Pain     Left Shoulder Arthroscopy DOS 04/18/2024, some pain when doing stretches at home.         Annamarie Osman follows in less than 4 months postop left shoulder arthroscopic rotator cuff repair and debridement.  She is doing a lot better.  She and physical therapy agreed to discontinue further treatment.  She is doing well with home exercises.  The shoulder is slowly getting stronger and she is no longer disappointed with her progress.      Allergies; medications; past medical, surgical, family, and social history; and problem list have been reviewed today and updated as indicated in this encounter seen below.    Exam: Active range of motion is excellent in the left shoulder.  There is only slight discomfort with resisted muscle testing this was not done to extremes.  Stability of the shoulder is good.        ASSESSMENT:    Annamarie was seen today for shoulder pain.    Diagnoses and all orders for this visit:    Impingement of left shoulder    Arthritis of left acromioclavicular joint    Tear of left rotator cuff, unspecified tear extent, unspecified whether traumatic    Bursitis of left shoulder        PLAN: Continue with home exercise.    Return if symptoms worsen or fail to improve.       Current Outpatient Medications   Medication Sig Dispense Refill    tretinoin (RETIN-A) 0.05 % cream APPLY SPARINGLY TO AFFECTED AREA(S) ONCE DAILY AT BEDTIME      minocycline (DYNACIN) 100 MG tablet as needed      buPROPion (WELLBUTRIN XL) 150 MG extended release tablet 1 tablet every morning      TOPAMAX 200 MG tablet at bedtime Pt to confirm dosage- takes migraine      Ciclopirox 1 % SHAM       clindamycin (CLEOCIN T) 1 % lotion       clobetasol (TEMOVATE) 0.05 % external solution       ketoconazole (NIZORAL) 2 % shampoo       estradiol (ESTRACE) 0.1 MG/GM vaginal cream APPLY 1/2g VAGINALLY THREE TIMES A WEEK AT NIGHT TIME 127.5 g 3    nortriptyline

## 2025-01-08 ENCOUNTER — HOSPITAL ENCOUNTER (OUTPATIENT)
Dept: GENERAL RADIOLOGY | Age: 55
Discharge: HOME | End: 2025-01-10
Payer: COMMERCIAL

## 2025-01-08 VITALS — WEIGHT: 143 LBS | BODY MASS INDEX: 25.33 KG/M2

## 2025-01-08 DIAGNOSIS — Z12.31 SCREENING MAMMOGRAM, ENCOUNTER FOR: ICD-10-CM

## 2025-01-08 PROCEDURE — 77067 SCR MAMMO BI INCL CAD: CPT

## (undated) DEVICE — NEEDLE SPNL L3.5IN PNK HUB S STL REG WALL FIT STYL W/ QNCKE

## (undated) DEVICE — APPLICATOR MEDICATED 26 CC SOLUTION HI LT ORNG CHLORAPREP

## (undated) DEVICE — STANDARD HYPODERMIC NEEDLE,POLYPROPYLENE HUB: Brand: MONOJECT

## (undated) DEVICE — TUBING PMP L16FT MAIN DISP FOR AR-6400 AR-6475

## (undated) DEVICE — GAUZE,SPONGE,4"X4",16PLY,STRL,LF,10/TRAY: Brand: MEDLINE

## (undated) DEVICE — PEN: MARKING STD 100/CS: Brand: MEDICAL ACTION INDUSTRIES

## (undated) DEVICE — CLOTH SKIN PREP 2% CHG

## (undated) DEVICE — 5-IN-1 BARBED CONNECTOR POLYPROPYLENE 3/16 - 9/16 IN. (5 - 14.3 MM): Brand: ARGYLE

## (undated) DEVICE — BLADE SHV L13CM DIA4MM DBL CUT COOLCUT

## (undated) DEVICE — 2957 MEDIPORE PRE-CUT DSG CVR 15CMX28CM: Brand: 3M COMPANY

## (undated) DEVICE — BASIC SINGLE BASIN 1-LF: Brand: MEDLINE INDUSTRIES, INC.

## (undated) DEVICE — TOWEL OR BLUEE 16X26IN ST 8 PACK ORB08 16X26ORTWL

## (undated) DEVICE — BASIC PACK: Brand: CONVERTORS

## (undated) DEVICE — TUBING, SUCTION, 3/16" X 10', STRAIGHT: Brand: MEDLINE

## (undated) DEVICE — NEEDLE HYPO 18GA L1.5IN PNK POLYPR HUB S STL REG BVL STR

## (undated) DEVICE — SYRINGE MED 10ML LUERLOCK TIP W/O SFTY DISP

## (undated) DEVICE — DRAPE SURG W88XL116IN SMS BODY SPL ORTH N FEN REINF FLD PCH

## (undated) DEVICE — GAUZE,SPONGE,4"X4",16PLY,XRAY,STRL,LF: Brand: MEDLINE

## (undated) DEVICE — SLEEVE TRAC SPANDEX LAT W/ 4IN COBAN SUPERFICIAL RAD NRV PD

## (undated) DEVICE — GLOVE ORANGE PI 8   MSG9080

## (undated) DEVICE — SOLUTION IRRIG 1000ML 09% SOD CHL USP PIC PLAS CONTAINER

## (undated) DEVICE — INTENDED FOR TISSUE SEPARATION, AND OTHER PROCEDURES THAT REQUIRE A SHARP SURGICAL BLADE TO PUNCTURE OR CUT.: Brand: BARD-PARKER ® STAINLESS STEEL BLADES

## (undated) DEVICE — SOLUTION IV IRRIG POUR BRL 0.9% SODIUM CHL 2F7124

## (undated) DEVICE — 3M™ STERI-DRAPE™ U-DRAPE 1015: Brand: STERI-DRAPE™

## (undated) DEVICE — GLOVE SURG 7 LTX STRL TRIFLEX LNG FINGER

## (undated) DEVICE — GOWN,PREVENTION PLUS,XLN/XL,ST,24/CS: Brand: MEDLINE

## (undated) DEVICE — MASTISOL ADHESIVE LIQ 2/3ML

## (undated) DEVICE — 3 ML SYRINGE LUER-LOCK TIP: Brand: MONOJECT

## (undated) DEVICE — 3M™ IOBAN™ 2 ANTIMICROBIAL INCISE DRAPE 6640EZ: Brand: IOBAN™ 2

## (undated) DEVICE — DRESSING GZ W1XL8IN COT XRFRM N ADH OVERWRAP CURAD

## (undated) DEVICE — 1810 FOAM BLOCK NEEDLE COUNTER: Brand: DEVON

## (undated) DEVICE — NEEDLE SCORPION HD MEGA LOADER

## (undated) DEVICE — SUPER TURBOVAC 90 INTEGRATED CABLE WAND ICW: Brand: COBLATION

## (undated) DEVICE — SLING & SWATHE: Brand: DEROYAL

## (undated) DEVICE — SCORPION-MULTIFIRE HUMPBACK

## (undated) DEVICE — DRAPE 100X60IN SHOULDER ARTHROSCOPY

## (undated) DEVICE — 3M™ STERI-STRIP™ REINFORCED ADHESIVE SKIN CLOSURES, R1547, 1/2 IN X 4 IN (12 MM X 100 MM), 6 STRIPS/ENVELOPE: Brand: 3M™ STERI-STRIP™

## (undated) DEVICE — 1010 S-DRAPE TOWEL DRAPE 10/BX: Brand: STERI-DRAPE™

## (undated) DEVICE — PAD ABD CURITY TENDERSORB 5X9IN